# Patient Record
(demographics unavailable — no encounter records)

---

## 2017-01-01 NOTE — PN
Date/Time of Note


Date/Time of Note


DATE: 17 


TIME: 11:35





[place] [facility]


Subjective Findings


Other Findings


LATE , AGA


MATERNAL GESTATIONAL HYPERTENSION


RETAINED LUNG FLUID, RESOLVED


4% WEIGHT LOSS. NORMAL PO/VOID/STOOL





Vital Signs


Vital Signs





 Vital Signs








  Date Time  Temp Pulse Resp B/P Pulse Ox O2 Delivery O2 Flow Rate FiO2


 


17 08:00 98.1 136 50     


 


17 04:05 98.4 133 48     





NPASS Score-Pain: 0





Physical Exam


HEENT:  [place] open,soft,flat, Normocephalic


Lungs:  Clear to auscultation


Heart:  Regular R&R, No murmur


Abdomen:  No hepatosplenomegaly, No masses


Skin:  Juandice (MILD)





Labs/Micro





Laboratory Tests








Test


  17


08:20


 


Bedside Glucose


  56mg/dL


()











Assessment


Pre-Term :  Boy


Assessment:  AGA





Plan


WELL 


MATERNAL LACTATION SUPPORT/EDUCATION


RETAINED LUNG FLUID. OBSERVED IN NICU X 4 HOURS. RESOLVED


LATER , ACCCUCHECKS ALL NORMAL


CCHD/BILI/HEARING SCREEN/CAR SEAT CHALLENGE PRIOR TO DISCHARGE











JAYLEEN INFANTE MD 2017 11:38

## 2017-01-01 NOTE — PN
Date/Time of Note


Date/Time of Note


DATE: 17 


TIME: 12:42





[place] [facility]


Subjective Findings


Other Findings


breast feeding well ,voiding and stooling .weight today is 2440gm, lost 6.8% 

since birth.





Vital Signs


Vital Signs





 Vital Signs








  Date Time  Temp Pulse Resp B/P Pulse Ox O2 Delivery O2 Flow Rate FiO2


 


17 11:36 98.0 140 33     


 


17 08:00 98.3 136 44     





NPASS Score-Pain: 0





Physical Exam


HEENT:  [place] open,soft,flat, Normocephalic


Lungs:  Clear to auscultation


Heart:  Regular R&R, No murmur


Abdomen:  Soft, No hepatosplenomegaly, No masses


Skin:  Juandice





Billirubin Risk Assessment


[place] Serum Bilirubin:  7.9


Bilirubin Risk Zone:  Low Intermediate Risk





Assessment


Pre-Term :  Boy


Assessment:  AGA,  Jaundice


jaundice : bili [id number] is 7.9mg/dl.





Plan


Plan :  Recheck bilirubin


continue same feeds


follow bili,phototherapy if >11


Routine care and immunisation











RYLIE SALEH MD 2017 12:47

## 2017-01-01 NOTE — PN
Date/Time of Note


Date/Time of Note


DATE: 3/2/17 


TIME: 13:05





 SOAP


Subjective Findings


Other Findings


LATE 


MAT GEST HTN


7% WEIGH TLOSS WITH NORMAL PO/VOID/STOOL





Vital Signs


Vital Signs





 Vital Signs








  Date Time  Temp Pulse Resp B/P Pulse Ox O2 Delivery O2 Flow Rate FiO2


 


3/2/17 07:30 98.6 136 40     





NPASS Score-Pain: 0





Physical Exam


HEENT:  [place] open,soft,flat, Normocephalic


Lungs:  Clear to auscultation


Heart:  Regular R&R, No murmur


Abdomen:  Soft, No hepatosplenomegaly, No masses


Skin:  Juandice (MILD)





Labs/Micro





Laboratory Tests








Test


  3/2/17


09:41


 


Total Bilirubin


  11.8mg/dl


(1.5-10.5)











Billirubin Risk Assessment


 Age (Hours):  120


[place] Serum Bilirubin:  11.4


Bilirubin Risk Zone:  Low Risk Zone





Assessment


Pre-Term [place]:  Boy


Assessment:  AGA


WELL 


CCHD/HEARING SCREEN PASSED


BILI AGE APPROPRIATE


MATERNAL EDUCATION/LACTATION SUPPORT











JAYLEEN INFANTE MD Mar 2, 2017 13:06

## 2017-01-01 NOTE — PN
Date/Time of Note


Date/Time of Note


DATE: 17 


TIME: 13:32





[place] [facility]


Subjective Findings


Other Findings


Feeding well with a 5.7% weight loss void and stool normal.


Jaundice: Minimal at this time bilirubin today 7.9 low intermediate risk zone.  

Follow clinically.


Hearing screen was passed needs see CCHD screen





Vital Signs


Vital Signs


NPASS Score-Pain: 0





Physical Exam


HEENT:  [place] open,soft,flat, Normocephalic


Lungs:  Clear to auscultation


Heart:  Regular R&R, No murmur


Abdomen:  Soft, No hepatosplenomegaly, No masses


Skin:  No rashes, Juandice





Labs/Micro





Laboratory Tests








Test


  17


07:05


 


Direct Bilirubin


  0.00mg/dl


(0.05-1.20)


 


Indirect Bilirubin


  7.9mg/dl


(0.6-10.5)


 


Total Bilirubin


  7.9mg/dl


(1.5-10.5)











Billirubin Risk Assessment


[place] Serum Bilirubin:  7.9


Bilirubin Risk Zone:  Low Intermediate Risk





Assessment


Term [place]:  Boy


Assessment:  AGA,  Jaundice





Plan


Routine care and teaching


Congenital heart disease screen prior to discharge


Monitor for signs of jaundice


Lactation support











GAURANG RIVERO MD 2017 13:34

## 2017-01-01 NOTE — PD.NBNDCI
Provider Discharge Instruction


Pediatrician Information


Clinic Information


Dr. Dean








Follow-up with Physician:   1











Diet


Breast Feeding Mothers:  Breast Feed Ad [place]





Referrals


Referral


None





Circumcision Instructions


Instructions


Circumcision not done





Additional Instructions


Additional Infomation


1.  Monitor for hyperbilirubinemia and see Dr. Dean in a.m.


2.  Monitor for weight gain











LEIGHTON SERNA MD Mar 1, 2017 13:43

## 2017-01-01 NOTE — DS
Date/Time of Note


Date/Time of Note


DATE: 3/1/17 


TIME: 13:38





[place] [facility]


Subjective Findings


Other Findings


Weight today is 2395 g, -8.5%.


Breast-feeding as well as feeding with expressed breast milk and nippling well 

and tolerating feedings well.  Urine output 8, BM 8.


Passed CCHD and hearing screen





Vital Signs


Vital Signs





 Vital Signs








  Date Time  Temp Pulse Resp B/P Pulse Ox O2 Delivery O2 Flow Rate FiO2


 


3/1/17 12:11 98.4 126 42     


 


3/1/17 08:15 98.2 124 40     





NPASS Score-Pain: 0





Physical Exam


Responsive, pink, comfortable, mild jaundice


HEENT:  [place] open,soft,flat, Normocephalic


Lungs:  Clear to auscultation


Heart:  Regular R&R, No murmur


Abdomen:  Soft, No hepatosplenomegaly, No masses


Skin:  No rashes, No signs of jaundice





Assessment


Pre-Term [place]:  Boy


Assessment:  AGA


4-day-old, late premature infant at 36.3 weeks.


Weight loss of -8.5% from birthweight.





Plan


1.  Continue to feed p.o. ad rima. on demand


2.  Monitor weight loss


3.  Monitor for hyperbilirubinemia


4.  Pediatric follow-up in a.m. if discharged or bili check in a.m. if infant 

continues to remain in the hospital





Pending Labs/Cultures





Laboratory Tests








Test


  17


14:08 3/1/17


10:00


 


Direct Bilirubin


  0.00mg/dl


(0.05-1.20) 0.00mg/dl


(0.05-1.20)


 


Indirect Bilirubin


  10.9mg/dl


(0.6-10.5) 12.6mg/dl


(0.6-10.5)


 


Total Bilirubin


  10.9mg/dl


(1.5-10.5) 12.6mg/dl


(1.5-10.5)





Low intermediate risk zone





Condition on Discharge


[place] Condition:  Good











LEIGHTON SERNA MD Mar 1, 2017 13:42
